# Patient Record
Sex: FEMALE | Race: WHITE | Employment: UNEMPLOYED | URBAN - METROPOLITAN AREA
[De-identification: names, ages, dates, MRNs, and addresses within clinical notes are randomized per-mention and may not be internally consistent; named-entity substitution may affect disease eponyms.]

---

## 2019-05-14 ENCOUNTER — HOSPITAL ENCOUNTER (OUTPATIENT)
Dept: PREADMISSION TESTING | Age: 24
Discharge: HOME | End: 2019-05-14
Payer: MEDICAID

## 2019-05-14 LAB
BASOPHILS # BLD: 0 K/UL (ref 0–0.1)
BASOPHILS NFR BLD: 0 % (ref 0–2)
DIFFERENTIAL METHOD BLD: NORMAL
EOSINOPHIL # BLD: 0.1 K/UL (ref 0–0.4)
EOSINOPHIL NFR BLD: 1 % (ref 0–5)
ERYTHROCYTE [DISTWIDTH] IN BLOOD BY AUTOMATED COUNT: 13.7 % (ref 11.6–14.5)
HCG SERPL QL: NEGATIVE
HCT VFR BLD AUTO: 39.6 % (ref 35–45)
HGB BLD-MCNC: 13.1 G/DL (ref 12–16)
LYMPHOCYTES # BLD: 3.2 K/UL (ref 0.9–3.6)
LYMPHOCYTES NFR BLD: 33 % (ref 21–52)
MCH RBC QN AUTO: 29.7 PG (ref 24–34)
MCHC RBC AUTO-ENTMCNC: 33.1 G/DL (ref 31–37)
MCV RBC AUTO: 89.8 FL (ref 74–97)
MONOCYTES # BLD: 0.6 K/UL (ref 0.05–1.2)
MONOCYTES NFR BLD: 6 % (ref 3–10)
NEUTS SEG # BLD: 5.8 K/UL (ref 1.8–8)
NEUTS SEG NFR BLD: 60 % (ref 40–73)
PLATELET # BLD AUTO: 243 K/UL (ref 135–420)
PMV BLD AUTO: 11.5 FL (ref 9.2–11.8)
RBC # BLD AUTO: 4.41 M/UL (ref 4.2–5.3)
WBC # BLD AUTO: 9.6 K/UL (ref 4.6–13.2)

## 2019-05-14 PROCEDURE — 84703 CHORIONIC GONADOTROPIN ASSAY: CPT

## 2019-05-14 PROCEDURE — 85025 COMPLETE CBC W/AUTO DIFF WBC: CPT

## 2019-05-14 PROCEDURE — 36415 COLL VENOUS BLD VENIPUNCTURE: CPT

## 2019-05-15 ENCOUNTER — ANESTHESIA (OUTPATIENT)
Dept: SURGERY | Age: 24
End: 2019-05-15
Payer: MEDICAID

## 2019-05-15 ENCOUNTER — HOSPITAL ENCOUNTER (OUTPATIENT)
Age: 24
Setting detail: OUTPATIENT SURGERY
Discharge: HOME | End: 2019-05-15
Attending: OBSTETRICS & GYNECOLOGY | Admitting: OBSTETRICS & GYNECOLOGY
Payer: MEDICAID

## 2019-05-15 ENCOUNTER — ANESTHESIA EVENT (OUTPATIENT)
Dept: SURGERY | Age: 24
End: 2019-05-15
Payer: MEDICAID

## 2019-05-15 VITALS
TEMPERATURE: 98.4 F | OXYGEN SATURATION: 100 % | HEART RATE: 72 BPM | DIASTOLIC BLOOD PRESSURE: 60 MMHG | SYSTOLIC BLOOD PRESSURE: 101 MMHG | BODY MASS INDEX: 32.39 KG/M2 | WEIGHT: 176 LBS | RESPIRATION RATE: 15 BRPM | HEIGHT: 62 IN

## 2019-05-15 PROBLEM — N92.6 IRREGULAR MENSES: Chronic | Status: ACTIVE | Noted: 2019-05-15

## 2019-05-15 PROBLEM — N83.201 CYST OF RIGHT OVARY: Chronic | Status: ACTIVE | Noted: 2019-05-15

## 2019-05-15 PROBLEM — N83.201 CYST OF RIGHT OVARY: Chronic | Status: RESOLVED | Noted: 2019-05-15 | Resolved: 2019-05-15

## 2019-05-15 PROBLEM — R10.31 RIGHT LOWER QUADRANT ABDOMINAL PAIN: Chronic | Status: ACTIVE | Noted: 2019-05-15

## 2019-05-15 LAB — HCG UR QL: NEGATIVE

## 2019-05-15 PROCEDURE — 88305 TISSUE EXAM BY PATHOLOGIST: CPT

## 2019-05-15 PROCEDURE — 74011250636 HC RX REV CODE- 250/636

## 2019-05-15 PROCEDURE — 76010000153 HC OR TIME 1.5 TO 2 HR: Performed by: OBSTETRICS & GYNECOLOGY

## 2019-05-15 PROCEDURE — 76210000021 HC REC RM PH II 0.5 TO 1 HR: Performed by: OBSTETRICS & GYNECOLOGY

## 2019-05-15 PROCEDURE — 74011250637 HC RX REV CODE- 250/637: Performed by: SPECIALIST

## 2019-05-15 PROCEDURE — 77030005537 HC CATH URETH BARD -A: Performed by: OBSTETRICS & GYNECOLOGY

## 2019-05-15 PROCEDURE — 77030020782 HC GWN BAIR PAWS FLX 3M -B: Performed by: OBSTETRICS & GYNECOLOGY

## 2019-05-15 PROCEDURE — 76060000034 HC ANESTHESIA 1.5 TO 2 HR: Performed by: OBSTETRICS & GYNECOLOGY

## 2019-05-15 PROCEDURE — 76210000006 HC OR PH I REC 0.5 TO 1 HR: Performed by: OBSTETRICS & GYNECOLOGY

## 2019-05-15 PROCEDURE — 77030014008 HC SPNG HEMSTAT J&J -C: Performed by: OBSTETRICS & GYNECOLOGY

## 2019-05-15 PROCEDURE — 74011000250 HC RX REV CODE- 250

## 2019-05-15 PROCEDURE — 74011250636 HC RX REV CODE- 250/636: Performed by: OBSTETRICS & GYNECOLOGY

## 2019-05-15 PROCEDURE — 77030008477 HC STYL SATN SLP COVD -A: Performed by: NURSE ANESTHETIST, CERTIFIED REGISTERED

## 2019-05-15 PROCEDURE — 77030006643: Performed by: NURSE ANESTHETIST, CERTIFIED REGISTERED

## 2019-05-15 PROCEDURE — 77030008683 HC TU ET CUF COVD -A: Performed by: NURSE ANESTHETIST, CERTIFIED REGISTERED

## 2019-05-15 PROCEDURE — 77030032490 HC SLV COMPR SCD KNE COVD -B: Performed by: OBSTETRICS & GYNECOLOGY

## 2019-05-15 PROCEDURE — 88307 TISSUE EXAM BY PATHOLOGIST: CPT

## 2019-05-15 PROCEDURE — 77030020255 HC SOL INJ LR 1000ML BG: Performed by: OBSTETRICS & GYNECOLOGY

## 2019-05-15 PROCEDURE — 77030008602 HC TRCR ENDOSC EPATH J&J -B: Performed by: OBSTETRICS & GYNECOLOGY

## 2019-05-15 PROCEDURE — 77030018836 HC SOL IRR NACL ICUM -A: Performed by: OBSTETRICS & GYNECOLOGY

## 2019-05-15 PROCEDURE — 77030008603 HC TRCR ENDOSC EPATH J&J -C: Performed by: OBSTETRICS & GYNECOLOGY

## 2019-05-15 PROCEDURE — 74011000250 HC RX REV CODE- 250: Performed by: OBSTETRICS & GYNECOLOGY

## 2019-05-15 PROCEDURE — 81025 URINE PREGNANCY TEST: CPT

## 2019-05-15 RX ORDER — SODIUM CHLORIDE, SODIUM LACTATE, POTASSIUM CHLORIDE, CALCIUM CHLORIDE 600; 310; 30; 20 MG/100ML; MG/100ML; MG/100ML; MG/100ML
125 INJECTION, SOLUTION INTRAVENOUS CONTINUOUS
Status: DISCONTINUED | OUTPATIENT
Start: 2019-05-15 | End: 2019-05-15 | Stop reason: HOSPADM

## 2019-05-15 RX ORDER — SODIUM CHLORIDE 0.9 % (FLUSH) 0.9 %
5-40 SYRINGE (ML) INJECTION AS NEEDED
Status: DISCONTINUED | OUTPATIENT
Start: 2019-05-15 | End: 2019-05-15 | Stop reason: HOSPADM

## 2019-05-15 RX ORDER — KETOROLAC TROMETHAMINE 30 MG/ML
INJECTION, SOLUTION INTRAMUSCULAR; INTRAVENOUS AS NEEDED
Status: DISCONTINUED | OUTPATIENT
Start: 2019-05-15 | End: 2019-05-15 | Stop reason: HOSPADM

## 2019-05-15 RX ORDER — FENTANYL CITRATE 50 UG/ML
25 INJECTION, SOLUTION INTRAMUSCULAR; INTRAVENOUS
Status: DISCONTINUED | OUTPATIENT
Start: 2019-05-15 | End: 2019-05-15 | Stop reason: HOSPADM

## 2019-05-15 RX ORDER — PROPOFOL 10 MG/ML
INJECTION, EMULSION INTRAVENOUS AS NEEDED
Status: DISCONTINUED | OUTPATIENT
Start: 2019-05-15 | End: 2019-05-15 | Stop reason: HOSPADM

## 2019-05-15 RX ORDER — LIDOCAINE HYDROCHLORIDE 20 MG/ML
INJECTION, SOLUTION EPIDURAL; INFILTRATION; INTRACAUDAL; PERINEURAL AS NEEDED
Status: DISCONTINUED | OUTPATIENT
Start: 2019-05-15 | End: 2019-05-15 | Stop reason: HOSPADM

## 2019-05-15 RX ORDER — SODIUM CHLORIDE 0.9 % (FLUSH) 0.9 %
5-40 SYRINGE (ML) INJECTION EVERY 8 HOURS
Status: DISCONTINUED | OUTPATIENT
Start: 2019-05-15 | End: 2019-05-15 | Stop reason: HOSPADM

## 2019-05-15 RX ORDER — ROCURONIUM BROMIDE 10 MG/ML
INJECTION, SOLUTION INTRAVENOUS AS NEEDED
Status: DISCONTINUED | OUTPATIENT
Start: 2019-05-15 | End: 2019-05-15 | Stop reason: HOSPADM

## 2019-05-15 RX ORDER — MIDAZOLAM HYDROCHLORIDE 1 MG/ML
INJECTION, SOLUTION INTRAMUSCULAR; INTRAVENOUS AS NEEDED
Status: DISCONTINUED | OUTPATIENT
Start: 2019-05-15 | End: 2019-05-15 | Stop reason: HOSPADM

## 2019-05-15 RX ORDER — DEXTROSE 50 % IN WATER (D50W) INTRAVENOUS SYRINGE
25-50 AS NEEDED
Status: DISCONTINUED | OUTPATIENT
Start: 2019-05-15 | End: 2019-05-15 | Stop reason: HOSPADM

## 2019-05-15 RX ORDER — GLYCOPYRROLATE 0.2 MG/ML
INJECTION INTRAMUSCULAR; INTRAVENOUS AS NEEDED
Status: DISCONTINUED | OUTPATIENT
Start: 2019-05-15 | End: 2019-05-15 | Stop reason: HOSPADM

## 2019-05-15 RX ORDER — DEXAMETHASONE SODIUM PHOSPHATE 4 MG/ML
INJECTION, SOLUTION INTRA-ARTICULAR; INTRALESIONAL; INTRAMUSCULAR; INTRAVENOUS; SOFT TISSUE AS NEEDED
Status: DISCONTINUED | OUTPATIENT
Start: 2019-05-15 | End: 2019-05-15 | Stop reason: HOSPADM

## 2019-05-15 RX ORDER — SODIUM CHLORIDE, SODIUM LACTATE, POTASSIUM CHLORIDE, CALCIUM CHLORIDE 600; 310; 30; 20 MG/100ML; MG/100ML; MG/100ML; MG/100ML
100 INJECTION, SOLUTION INTRAVENOUS CONTINUOUS
Status: DISCONTINUED | OUTPATIENT
Start: 2019-05-15 | End: 2019-05-15 | Stop reason: HOSPADM

## 2019-05-15 RX ORDER — MAGNESIUM SULFATE 100 %
4 CRYSTALS MISCELLANEOUS AS NEEDED
Status: DISCONTINUED | OUTPATIENT
Start: 2019-05-15 | End: 2019-05-15 | Stop reason: HOSPADM

## 2019-05-15 RX ORDER — INSULIN LISPRO 100 [IU]/ML
INJECTION, SOLUTION INTRAVENOUS; SUBCUTANEOUS ONCE
Status: DISCONTINUED | OUTPATIENT
Start: 2019-05-15 | End: 2019-05-15 | Stop reason: HOSPADM

## 2019-05-15 RX ORDER — NALOXONE HYDROCHLORIDE 0.4 MG/ML
0.2 INJECTION, SOLUTION INTRAMUSCULAR; INTRAVENOUS; SUBCUTANEOUS AS NEEDED
Status: DISCONTINUED | OUTPATIENT
Start: 2019-05-15 | End: 2019-05-15 | Stop reason: HOSPADM

## 2019-05-15 RX ORDER — ACETAMINOPHEN 500 MG
500 TABLET ORAL
Status: COMPLETED | OUTPATIENT
Start: 2019-05-15 | End: 2019-05-15

## 2019-05-15 RX ORDER — FENTANYL CITRATE 50 UG/ML
INJECTION, SOLUTION INTRAMUSCULAR; INTRAVENOUS AS NEEDED
Status: DISCONTINUED | OUTPATIENT
Start: 2019-05-15 | End: 2019-05-15 | Stop reason: HOSPADM

## 2019-05-15 RX ORDER — ONDANSETRON 2 MG/ML
INJECTION INTRAMUSCULAR; INTRAVENOUS AS NEEDED
Status: DISCONTINUED | OUTPATIENT
Start: 2019-05-15 | End: 2019-05-15 | Stop reason: HOSPADM

## 2019-05-15 RX ADMIN — SODIUM CHLORIDE, SODIUM LACTATE, POTASSIUM CHLORIDE, AND CALCIUM CHLORIDE: 600; 310; 30; 20 INJECTION, SOLUTION INTRAVENOUS at 14:48

## 2019-05-15 RX ADMIN — SODIUM CHLORIDE, SODIUM LACTATE, POTASSIUM CHLORIDE, AND CALCIUM CHLORIDE: 600; 310; 30; 20 INJECTION, SOLUTION INTRAVENOUS at 15:56

## 2019-05-15 RX ADMIN — SODIUM CHLORIDE, SODIUM LACTATE, POTASSIUM CHLORIDE, AND CALCIUM CHLORIDE 125 ML/HR: 600; 310; 30; 20 INJECTION, SOLUTION INTRAVENOUS at 13:36

## 2019-05-15 RX ADMIN — FENTANYL CITRATE 50 MCG: 50 INJECTION, SOLUTION INTRAMUSCULAR; INTRAVENOUS at 14:22

## 2019-05-15 RX ADMIN — KETOROLAC TROMETHAMINE 30 MG: 30 INJECTION, SOLUTION INTRAMUSCULAR; INTRAVENOUS at 15:48

## 2019-05-15 RX ADMIN — ACETAMINOPHEN 500 MG: 500 TABLET ORAL at 16:54

## 2019-05-15 RX ADMIN — ROCURONIUM BROMIDE 40 MG: 10 INJECTION, SOLUTION INTRAVENOUS at 14:17

## 2019-05-15 RX ADMIN — FENTANYL CITRATE 50 MCG: 50 INJECTION, SOLUTION INTRAMUSCULAR; INTRAVENOUS at 15:29

## 2019-05-15 RX ADMIN — GLYCOPYRROLATE 0.2 MG: 0.2 INJECTION INTRAMUSCULAR; INTRAVENOUS at 14:16

## 2019-05-15 RX ADMIN — LIDOCAINE HYDROCHLORIDE 65 MG: 20 INJECTION, SOLUTION EPIDURAL; INFILTRATION; INTRACAUDAL; PERINEURAL at 14:16

## 2019-05-15 RX ADMIN — ONDANSETRON 4 MG: 2 INJECTION INTRAMUSCULAR; INTRAVENOUS at 14:25

## 2019-05-15 RX ADMIN — DEXAMETHASONE SODIUM PHOSPHATE 4 MG: 4 INJECTION, SOLUTION INTRA-ARTICULAR; INTRALESIONAL; INTRAMUSCULAR; INTRAVENOUS; SOFT TISSUE at 14:25

## 2019-05-15 RX ADMIN — MIDAZOLAM HYDROCHLORIDE 2 MG: 1 INJECTION, SOLUTION INTRAMUSCULAR; INTRAVENOUS at 14:12

## 2019-05-15 RX ADMIN — ROCURONIUM BROMIDE 10 MG: 10 INJECTION, SOLUTION INTRAVENOUS at 15:29

## 2019-05-15 RX ADMIN — PROPOFOL 135 MG: 10 INJECTION, EMULSION INTRAVENOUS at 14:16

## 2019-05-15 NOTE — DISCHARGE INSTRUCTIONS
[facility], [address], [address] [facility], Ayden Sutton  [facility], [address] [facility], [address] [facility] [address], [place], [address] [facility]  Office: [phone]  Fax:    [phone]      Notify [facility] IMMEDIATELY if any of the following occur:     You are unable to urinate. Urgency to urinate is not uncommon.  Excessive vaginal bleeding > 1 maxi pad an hour for more then 2 hours straight.  Temperature above 101.0° and / or chills.  You are nauseous and / or vomiting and you cannot hold down any fluids.  Your pain is not controlled with the pain medication prescribed. Special Considerations:      Do not drive for at least 24 hours after the procedure and until you are no longer taking narcotic pain medication and you are able to move and react without hesitation.  You may return to work in 1-2 weeks. [x] Pelvic rest (nothing in the vagina) for 3 weeks. [x] No heavy lifting over 10 pounds & no strenuous exercise for 3 weeks. [] Other instructions:         MEDICATIONS: PROVIDED AT DISCHARGE OR PREVIOUSLY PRESCRIBED AT PRE OPERATIVE APPOINTMENT WITH [facility] --  Narcotic Pain Med.   []  Vicodin®   [x]  Percocet®   []  Dilaudid®    Non-narcotic Pain Med. [x]   Ibuprofen        Antibiotics   []  Cipro®   []  Keflex®     []  Bactrim DS®       Urgency   []   Vesicare®       Nausea      []    Zofran®     []    Phenergan®       Other   []    Colace          If you have not already scheduled your post operative appointment please do so with our office staff. Your appointment should be in 3 weeks. Please contact [place] [address] [facility] at [phone] or go to the nearest Emergency Department / Urgent Care facility for any other medical questions or concerns.           DISCHARGE SUMMARY from Nurse    PATIENT INSTRUCTIONS:    After general anesthesia or intravenous sedation, for 24 hours or while taking prescription Narcotics:  · Limit your activities  · Do not drive and operate hazardous machinery  · Do not make important personal or business decisions  · Do  not drink alcoholic beverages  · If you have not urinated within 8 hours after discharge, please contact your surgeon on call. Report the following to your surgeon:  · Excessive pain, swelling, redness or odor of or around the surgical area  · Temperature over 100.5  · Nausea and vomiting lasting longer than 4 hours or if unable to take medications  · Any signs of decreased circulation or nerve impairment to extremity: change in color, persistent  numbness, tingling, coldness or increase pain  · Any questions    What to do at Home:  Recommended activity: Activity as tolerated and no driving for today and Ambulate in house. If you experience any of the following symptoms as highlighted above, please follow up with Dr. Vladimir Murphy. *  Please give a list of your current medications to your Primary Care Provider. *  Please update this list whenever your medications are discontinued, doses are      changed, or new medications (including over-the-counter products) are added. *  Please carry medication information at all times in case of emergency situations. These are general instructions for a healthy lifestyle:    No smoking/ No tobacco products/ Avoid exposure to second hand smoke  Surgeon General's Warning:  Quitting smoking now greatly reduces serious risk to your health. Obesity, smoking, and sedentary lifestyle greatly increases your risk for illness    A healthy diet, regular physical exercise & weight monitoring are important for maintaining a healthy lifestyle    You may be retaining fluid if you have a history of heart failure or if you experience any of the following symptoms:  Weight gain of 3 pounds or more overnight or 5 pounds in a week, increased swelling in our hands or feet or shortness of breath while lying flat in bed.   Please call your doctor as soon as you notice any of these symptoms; do not wait until your next office visit. Recognize signs and symptoms of STROKE:    F-face looks uneven    A-arms unable to move or move unevenly    S-speech slurred or non-existent    T-time-call 911 as soon as signs and symptoms begin-DO NOT go       Back to bed or wait to see if you get better-TIME IS BRAIN. Warning Signs of HEART ATTACK     Call 911 if you have these symptoms:   Chest discomfort. Most heart attacks involve discomfort in the center of the chest that lasts more than a few minutes, or that goes away and comes back. It can feel like uncomfortable pressure, squeezing, fullness, or pain.  Discomfort in other areas of the upper body. Symptoms can include pain or discomfort in one or both arms, the back, neck, jaw, or stomach.  Shortness of breath with or without chest discomfort.  Other signs may include breaking out in a cold sweat, nausea, or lightheadedness. Don't wait more than five minutes to call 911 - MINUTES MATTER! Fast action can save your life. Calling 911 is almost always the fastest way to get lifesaving treatment. Emergency Medical Services staff can begin treatment when they arrive -- up to an hour sooner than if someone gets to the hospital by car. The discharge information has been reviewed with the patient and caregiver. The patient and caregiver verbalized understanding. Discharge medications reviewed with the patient and caregiver and appropriate educational materials and side effects teaching were provided. Patient armband removed and shredded.     ___________________________________________________________________________________________________________________________________

## 2019-05-15 NOTE — PERIOP NOTES
If Zulma Francisco is unable to get a hold for discharge , please call patient's brother Love Bennett [phone]. Patient stated that it was okay for brother in law to  and sign discharge paperwork.

## 2019-05-15 NOTE — PERIOP NOTES
Urine pregnancy results negative and verified with Jaimie Weir. Reviewed PTA medication list with patient/caregiver and patient/caregiver denies any additional medications. Patient admits to having a responsible adult care for them at home for at least 24 hours after surgery. Patient denies allan chewing/swallowing difficulties. Patient encouraged to use Ellen paws warming system and informed that using Ellen paws to regulate body temperature prior to a procedure has been shown to help reduce the risks of blood clots and infection. Dual skin assessment & fall risk band verification completed with Jeannette Gardner RN.

## 2019-05-15 NOTE — PERIOP NOTES
Patient requesting prescription for zofran for home use. Instructed to call Dr. Mariola Lyn office; patient denies any current nausea, but \"wants it just in case I get sick. \" 
 
Dr. Mariola Lyn office number printed on discharge instructions, and written on front discharge folder

## 2019-05-15 NOTE — PERIOP NOTES
Brother in law to bedside to sign discharge instructions for patient. Basic instructions went over with family regarding care after anesthesia, but specific instructions for procedure printed and given to patient to take home. Instructions reviewed with patient and will be reviewed with Patient's wife upon arrival home. AVS medication list reviewed and no duplicates noted.

## 2019-05-15 NOTE — ANESTHESIA POSTPROCEDURE EVALUATION
Post-Anesthesia Evaluation and Assessment Cardiovascular Function/Vital Signs Visit Vitals /64 Pulse 94 Temp 36.9 °C (98.4 °F) Resp 15 Ht 5' 2\" (1.575 m) Wt 79.8 kg (176 lb) SpO2 100% BMI 32.19 kg/m² Patient is status post Procedure(s): LAPAROSCOPIC RIGHT OVARIAN CYSTECTOMY, LYSIS OF ADHESIONS, 
DILATATION AND CURETTAGE HYSTEROSCOPY. Nausea/Vomiting: Controlled. Postoperative hydration reviewed and adequate. Pain: 
Pain Scale 1: FLACC (05/15/19 1644) Pain Intensity 1: 0 (05/15/19 1644) Managed. Neurological Status:  
Neuro (WDL): Within Defined Limits (05/15/19 1608) At baseline. Mental Status and Level of Consciousness: Arousable. Pulmonary Status:  
O2 Device: Room air (05/15/19 1616) Adequate oxygenation and airway patent. Complications related to anesthesia: None Post-anesthesia assessment completed. No concerns. Patient has met all discharge requirements. Signed By: Cindy Pink CRNA May 15, 2019

## 2019-05-15 NOTE — PERIOP NOTES
TRANSFER - IN REPORT: 
 
Verbal report received from ORN and CRNA(name) on Alpa Bautista  being received from OR(unit) for routine progression of care Report consisted of patients Situation, Background, Assessment and  
Recommendations(SBAR). Information from the following report(s) SBAR, Kardex, OR Summary, Procedure Summary, MAR and Recent Results was reviewed with the receiving nurse. Opportunity for questions and clarification was provided. Assessment completed upon patients arrival to unit and care assumed.

## 2019-05-15 NOTE — INTERVAL H&P NOTE
H&P Update: 
Tammi Maguire was seen and examined. History and physical has been reviewed. The patient has been examined.  There have been no significant clinical changes since the completion of the originally dated History and Physical.

## 2019-05-15 NOTE — PERIOP NOTES
Doris Stallworth RN in [facility] [address] [facility] notified about patient's request of not having Dr. aMrgarito Rizo speak to any one about the procedure and what occurred during the procedure. Patient will speak to Dr. Margarito Rizo in Holding about discussing what occurred at her post up visit in her office. Either Hunter Lang or Ambrose Fitzgerald will be in the Lobby to sign off on the discharge instructions and to take the patient home.

## 2019-05-15 NOTE — PERIOP NOTES
Emil Raman RN in holding, Eileen Servin RN in PACU and Joseph Freeman RN in Phase 2 notified that patient does not want her procedure to be discussed with the family members that are going take her home or for the procedure to be on the discharge instructions.

## 2019-05-15 NOTE — DISCHARGE SUMMARY
Discharge Summary     Name: Luz Maria Murrieta MRN: [id number]  SSN: [id number]    YOB: 1995  Age: 25 y.o. Sex: female      Allergies: Other plant, animal, environmental; Chlorhexidine; and Other medication    Admit Date: 5/15/2019    Discharge Date: 5/15/2019      Admitting Physician: Stephania Goss MD     * Admission Diagnoses: Abnormal uterine bleeding, Right Ovarian cyst  and Lower abdominal pain    * Discharge Diagnoses:   Hospital Problems as of 5/15/2019 Date Reviewed: 5/15/2019          Codes Class Noted - Resolved POA    Right lower quadrant abdominal pain (Chronic) ICD-10-CM: R10.31  ICD-9-CM: 789.03  5/15/2019 - Present Yes        Irregular menses (Chronic) ICD-10-CM: N92.6  ICD-9-CM: 626.4  5/15/2019 - Present Yes        * (Principal) RESOLVED: Cyst of right ovary (Chronic) ICD-10-CM: N83.201  ICD-9-CM: 620.2  5/15/2019 - 5/15/2019 Yes               * Procedures: Laparoscopic Right Ovarian Cystectomy, Hysteroscopy D&C Polypectomy    * Discharge Condition: [facility] Course: Normal hospital course for this procedure. Significant Diagnostic Studies:   Recent Results (from the past 24 hour(s))   HCG URINE, QL. - POC    Collection Time: 05/15/19 12:55 PM   Result Value Ref Range    Pregnancy test,urine (POC) NEGATIVE  NEG         * Disposition: Home    Discharge Medications:   Current Discharge Medication List      CONTINUE these medications which have NOT CHANGED    Details   omeprazole (PRILOSEC) 40 mg capsule Take 40 mg by mouth daily. fluticasone propion-salmeterol (ADVAIR DISKUS) 500-50 mcg/dose diskus inhaler Take 1 Puff by inhalation every twelve (12) hours. ALPRAZolam (XANAX) 1 mg tablet Take 1 mg by mouth two (2) times a day. fexofenadine (ALLEGRA) 180 mg tablet Take 180 mg by mouth daily. albuterol (PROVENTIL HFA, VENTOLIN HFA, PROAIR HFA) 90 mcg/actuation inhaler Take  by inhalation every four (4) hours as needed for Wheezing.       fluticasone propionate [facility] ALLERGY RELIEF) 50 mcg/actuation nasal spray 2 Sprays by Both Nostrils route nightly. * Follow-up Care/Patient Instructions: Activity: No sex, douching, or tampons for 3 weeks or as directed by your physician. No heavy lifting for 3 weeks. No driving while taking pain medication. Diet: Resume pre-hospital diet  Wound Care: Keep wound clean and dry and pelvic rest x 3 weeks.

## 2019-05-15 NOTE — ANESTHESIA PREPROCEDURE EVALUATION
Relevant Problems No relevant active problems Anesthetic History Review of Systems / Medical History Patient summary reviewed, nursing notes reviewed and pertinent labs reviewed Pulmonary Smoker Asthma : well controlled Neuro/Psych Pertinent negatives: No seizures Comments: Pt slept on her hand and denies seizure Cardiovascular Exercise tolerance: >4 METS 
  
GI/Hepatic/Renal 
  
 
 
 
Liver disease Endo/Other Within defined limits Other Findings Physical Exam 
 
Airway Mallampati: III 
TM Distance: < 4 cm Neck ROM: normal range of motion Mouth opening: Normal 
 
 Cardiovascular Rhythm: regular Rate: normal 
 
 
 
 Dental 
No notable dental hx Pulmonary Breath sounds clear to auscultation Abdominal 
GI exam deferred Other Findings Anesthetic Plan ASA: 2 Anesthesia type: general 
 
 
 
 
Induction: Intravenous Anesthetic plan and risks discussed with: Patient

## 2019-05-15 NOTE — PERIOP NOTES
Family member called claiming to be patient's wife, requesting information about patient status. No information was given over the phone per patient request.  Patient was then heard talking on the phone stating, \"I can't believe that you called here pretending to be my wife! \"  This RN checked on patient and verified that she has a safe living environment, and was safe to go home; patient states that she is.

## 2019-05-15 NOTE — PERIOP NOTES
Contacted Ilya Jane to confirm  for patient and to be able to sign off on discharge instructions. Patient requested that her procedure is not discussed with Sharron Maki. Sharron Maki states that she will be there as soon as patient is done with surgery and agrees to sign for patient discharge.

## 2019-05-16 NOTE — OP NOTES
[facility] [place]  OPERATIVE REPORT    Name:  Nathalie Tamayo  MR#:   [id number]  :  1995  ACCOUNT #:  [de-identified]  DATE OF SERVICE:  05/15/2019    PREOPERATIVE DIAGNOSES:  1  Lower abdominal pain. 2.  Right enlarged ovarian cyst.  3.  Irregular menses. 4.  Primary dysmenorrhea. POSTOPERATIVE DIAGNOSES:  1  Lower abdominal pain. 2.  Right enlarged ovarian cyst.  3.  Irregular menses. 4.  Primary dysmenorrhea. PROCEDURE PERFORMED:  1. Hysteroscopy and dilatation and curettage with polypectomy. 2.  Laparoscopic right ovarian cystectomy. SURGEON:  Izabela Palafox MD    ASSISTANT:  none    ANESTHESIOLOGIST:  Dr. Sarah Sheldon. ANESTHESIA:  General paracervical block. COMPLICATIONS:  None. SPECIMENS REMOVED:  1.  Endometrial polyp. 2.  Endometrial curettings. 3.  Right ovarian cyst wall. IMPLANTS:  None. ESTIMATED BLOOD LOSS:  20 mL. IV FLUIDS:  2000 mL lactated Ringer's. URINE OUTPUT:  700 mL clear urine at the end of procedure. FINDINGS:  1. Anteverted uterus sounding to 9 cm. Normal-appearing ostia bilaterally. 2.  Polypoid endometrial lining with a prominent polyp noted on the left posterior lower uterine segment. 3.  On laparoscopy, normal-appearing uterus, fallopian tubes bilaterally. Left ovary with a simple appearing ovarian cyst, otherwise, unremarkable right enlarged ovary measuring about 9.5 cm with clear blood-tinged fluid noted. 4.  Normal-appearing appendix. INDICATIONS:  This is a 25year-old G5, P1-0-4-1 with complaints of irregular menses and primary dysmenorrhea. She received a transvaginal ultrasound on 2019 demonstrated axial uterus 6.9 x 4.8 x 3.5 cm. Endometrial thickness was 7.7 mm. Right ovary with a simple cyst 8.9 x 7.9 x 5.1 cm with negative flow. Left ovary 3.1 x 2.0 x 2.1 cm. No free fluid appreciated. She had complained of random lower abdominal pain as well. All these findings were reviewed with the patient.   Risks, benefits, and alternatives were discussed and she opted for surgical management as stated above. All questions were answered prior to surgical start time. PROCEDURE:  The patient was taken to the OR where general anesthesia was obtained without difficulty. She was prepared and draped in the usual sterile fashion in the dorsal lithotomy position with legs in stirrups. A weighted speculum was placed in the vagina and Schafer retractor in the anterior fornix to expose the cervix. The anterior lip of the cervix was grasped with a single-tooth tenaculum and a paracervical block was performed using 20 mL of 0.25% Marcaine plain. The cervical os was gradually dilated to allow introduction of the hysteroscope. This was advanced into the uterus under direct visualization with the water running. The above findings were noted. The hysteroscope was removed and a Kermit Adlinda forceps was used to perform the polypectomy followed by a gentle curettage until a gritty texture was noted throughout. Several passes were made with the final look we dealing removal of the polyp and polypoid tissue throughout. The specimens were handed off. Hulka tenaculum was placed into the uterus as mentioned and manipulation throughout the remainder of the case and all other instruments were removed. Attention was then turned to above where a 5-mm vertical skin incision was made in the infraumbilical ford and a Veress needle was placed with two audible pops. The CO2 gas was turned on and abdomen allowed to insufflate to about 15 mmHg. The Veress needle was removed and a 5-mm bladeless trocar was advanced intra-abdominally under direct visualization.   The CO2 gas was turned back on and immediate inspection beneath the trocar insertion site demonstrated no obvious trauma, and the patient was placed in steep Trendelenburg and another 5-mm bladeless trocars were placed in midline 2 cm above the pubic bone and another 5 mm AirSeal trocar was placed in the left lower quadrant 2 cm medial and superior to the anterior superior iliac spine. A survey of the abdomen and pelvis demonstrated the above findings. Attention was turned to the right adnexa where the enlarged ovary was stabilized up against the abdominal sidewall and the ACE Harmonic was used to make a linear incision with cystotomy performed with care. A blood-tinged fluid noted. The cyst wall then peeled out from within the ovary with [place] grasper. The bipolar Kleppinger was used to obtain excellent hemostasis and several chips of Nu-Knit were placed within the ovarian defect to ensure excellent hemostasis. The fallopian tube was again noted to be within normal limits and the CO2 gas was then turned off and allowed to escape from the abdomen. All instruments were removed from the body. All trocar sites were sealed with Dermabond with excellent hemostasis assured at the end of case. The patient tolerated the procedure well. Sponge, lap, needle, and instrument counts were correct x2. She was taken to recovery area in stable condition.       Oneil Solorzano MD      [id number]  D:  05/15/2019 16:05  T:  05/16/2019 3:36  JOB #:  [id number]

## (undated) DEVICE — KENDALL SCD EXPRESS SLEEVES, KNEE LENGTH, MEDIUM: Brand: KENDALL SCD

## (undated) DEVICE — SOL IRRIGATION INJ NACL 0.9% 500ML BTL

## (undated) DEVICE — SYR 10ML LUER LOK 1/5ML GRAD --

## (undated) DEVICE — D&C HYSTEROSCOPY: Brand: MEDLINE INDUSTRIES, INC.

## (undated) DEVICE — GYN LAPAROSCOPY: Brand: MEDLINE INDUSTRIES, INC.

## (undated) DEVICE — AGENT HEMSTAT W6XL9IN OXIDIZED REGENERATED CELOS ABSRB FOR

## (undated) DEVICE — 40580 - THE PINK PAD - ADVANCED TRENDELENBURG POSITIONING KIT: Brand: 40580 - THE PINK PAD - ADVANCED TRENDELENBURG POSITIONING KIT

## (undated) DEVICE — NEEDLE HYPO 22GA L1 1/2IN PIVOTING SHLD FOR LUERLOCK SYR

## (undated) DEVICE — CATH URETH INTMIT ROB 16FR FUN -- CONVERT TO ITEM 179520

## (undated) DEVICE — TROCAR LAP L100MM DIA5MM BLDELSS W/ STBL SL ENDOPATH XCEL

## (undated) DEVICE — SOLUTION LACTATED RINGERS INJECTION USP

## (undated) DEVICE — (D)PREP SKN CHLRAPRP APPL 26ML -- CONVERT TO ITEM 371833

## (undated) DEVICE — SLEEVE TRCR L100MM DIA5MM UNIV STBL FOR BLDELSS DIL TIP

## (undated) DEVICE — STERILE POLYISOPRENE POWDER-FREE SURGICAL GLOVES: Brand: PROTEXIS

## (undated) DEVICE — PAD,NON-ADHERENT,3X8,STERILE,LF,1/PK: Brand: MEDLINE